# Patient Record
Sex: MALE | Race: WHITE | Employment: FULL TIME | URBAN - METROPOLITAN AREA
[De-identification: names, ages, dates, MRNs, and addresses within clinical notes are randomized per-mention and may not be internally consistent; named-entity substitution may affect disease eponyms.]

---

## 2019-09-26 ENCOUNTER — OFFICE VISIT (OUTPATIENT)
Dept: OPTOMETRY | Facility: CLINIC | Age: 64
End: 2019-09-26
Payer: COMMERCIAL

## 2019-09-26 DIAGNOSIS — H52.222 REGULAR ASTIGMATISM OF LEFT EYE: ICD-10-CM

## 2019-09-26 DIAGNOSIS — H25.13 NUCLEAR AGE-RELATED CATARACT, BOTH EYES: ICD-10-CM

## 2019-09-26 DIAGNOSIS — H52.01 HYPERMETROPIA, RIGHT: ICD-10-CM

## 2019-09-26 DIAGNOSIS — H35.373 MACULAR PUCKERING OF RETINA, BILATERAL: ICD-10-CM

## 2019-09-26 DIAGNOSIS — H52.4 PRESBYOPIA: ICD-10-CM

## 2019-09-26 DIAGNOSIS — H43.813 PVD (POSTERIOR VITREOUS DETACHMENT), BILATERAL: ICD-10-CM

## 2019-09-26 DIAGNOSIS — Z01.01 ENCOUNTER FOR EXAMINATION OF EYES AND VISION WITH ABNORMAL FINDINGS: Primary | ICD-10-CM

## 2019-09-26 DIAGNOSIS — H04.123 DRY EYES: ICD-10-CM

## 2019-09-26 PROCEDURE — 92015 DETERMINE REFRACTIVE STATE: CPT | Performed by: OPTOMETRIST

## 2019-09-26 PROCEDURE — 92004 COMPRE OPH EXAM NEW PT 1/>: CPT | Performed by: OPTOMETRIST

## 2019-09-26 ASSESSMENT — TONOMETRY
IOP_METHOD: APPLANATION
OS_IOP_MMHG: 15
OD_IOP_MMHG: 15

## 2019-09-26 ASSESSMENT — REFRACTION_MANIFEST
OS_AXIS: 118
OD_SPHERE: +0.50
OS_ADD: +2.50
OD_CYLINDER: SPHERE
OS_SPHERE: PLANO
OS_CYLINDER: +0.50
OD_ADD: +2.50

## 2019-09-26 ASSESSMENT — SLIT LAMP EXAM - LIDS
COMMENTS: NORMAL
COMMENTS: NORMAL

## 2019-09-26 ASSESSMENT — CONF VISUAL FIELD
OD_NORMAL: 1
OS_NORMAL: 1

## 2019-09-26 ASSESSMENT — EXTERNAL EXAM - RIGHT EYE: OD_EXAM: NORMAL

## 2019-09-26 ASSESSMENT — VISUAL ACUITY
OS_SC: 20/200
METHOD: SNELLEN - LINEAR
OS_SC: 20/40
OD_SC: 20/70
OD_SC: 20/200

## 2019-09-26 ASSESSMENT — EXTERNAL EXAM - LEFT EYE: OS_EXAM: NORMAL

## 2019-09-26 ASSESSMENT — CUP TO DISC RATIO
OS_RATIO: 0.2
OD_RATIO: 0.2

## 2019-09-26 NOTE — PROGRESS NOTES
Chief Complaint   Patient presents with     Annual Eye Exam      Accompanied by self  Last Eye Exam: ? Maybe first, patient unsure  Dilated Previously: Yes    What are you currently using to see?  readers       Distance Vision Acuity: Satisfied with vision    Near Vision Acuity: Not satisfied, uses readers +2.00    Eye Comfort: burning  Do you use eye drops? : No  Occupation or Hobbies: manager    Theresa Rosa, [employer] Tech          Medical, surgical and family histories reviewed and updated 9/26/2019.       OBJECTIVE: See Ophthalmology exam    ASSESSMENT:    ICD-10-CM    1. Encounter for examination of eyes and vision with abnormal findings Z01.01    2. Dry eyes H04.123    3. Macular puckering of retina, bilateral H35.373    4. Nuclear age-related cataract, both eyes H25.13    5. PVD (posterior vitreous detachment), bilateral H43.813    6. Hypermetropia, right H52.01    7. Regular astigmatism of left eye H52.222    8. Presbyopia H52.4       PLAN:     Patient Instructions    DRY EYE TREATMENT    I recommend using artificial tears for your dry eye. There are over the counter drops that work well and may be used up to 4x daily. ( systane balance, refresh optive, soothe xp)   If you need more than 4 drops daily, use a preservative free product which come in individual vials which may be used for 24 hours and discarded.     Artificial tears work best as a preventative and not as well after your eyes are starting to bother you.  It may take 4- 6 weeks of using the drops before you notice improvement.  If after that time you are still having problems schedule an appointment for an evaluation and discussion of different treatments.  Dry eyes are a chronic condition and you may have more symptoms at certain times of the year.      Additional recommended treatment:  Warm compresses once to twice daily for 5-10 minutes    Directions for warm soaks  There are few methods for hot compresses. Moisten a washcloth with hot  water, or microwave for 10 seconds, being careful to not get the cloth too hot.   Then put the washcloth onto your eyelids for 5 minutes. It will cool quickly so a rice pack or eyemask that can be heated and laid on top of the washcloth will help retain the heat.        You have a PVD- posterior vitreous detachment which is due to the gel of the eye shrinking and clumping together.  This can sometimes cause holes or tears in the retina.  The signs of a retinal detachment are flashes of light or a curtain coming over the vision. If you notice any of these changes please let me know right away.  If I am not available this is an emergency type situation that you would need to be seen.    You have the start of mild cataracts.  You may notice some blurred vision or glare with night driving.  It is important that you wear good sunglasses to protect your eyes from the ultraviolet light from the sun.     You have an epiretinal membrane.  As we grow older, the thick vitreous gel in the middle of our eyes begins to shrink and pull away from the macula. As the vitreous pulls away, scar tissue may develop on the macula. Sometimes the scar tissue can warp and contract, causing the retina to wrinkle or become swollen or distorted.    Héctor was advised of today's exam findings.  Fill glasses prescription  Allow 2 weeks to adapt to change in glasses  Wear glasses full time  Copy of glasses Rx provided today.  Return in 1 year for eye exam, or sooner if needed.    The effects of the dilating drops last for 4- 6 hours.  You will be more sensitive to light and vision will be blurry up close.  Mydriatic sunglasses were given if needed.      Miko Madrigal O.D.  [facility] [place] [place]  [address]    [phone]

## 2019-09-26 NOTE — PATIENT INSTRUCTIONS
DRY EYE TREATMENT    I recommend using artificial tears for your dry eye. There are over the counter drops that work well and may be used up to 4x daily. ( systane balance, refresh optive, soothe xp)   If you need more than 4 drops daily, use a preservative free product which come in individual vials which may be used for 24 hours and discarded.     Artificial tears work best as a preventative and not as well after your eyes are starting to bother you.  It may take 4- 6 weeks of using the drops before you notice improvement.  If after that time you are still having problems schedule an appointment for an evaluation and discussion of different treatments.  Dry eyes are a chronic condition and you may have more symptoms at certain times of the year.      Additional recommended treatment:  Warm compresses once to twice daily for 5-10 minutes    Directions for warm soaks  There are few methods for hot compresses. Moisten a washcloth with hot water, or microwave for 10 seconds, being careful to not get the cloth too hot.   Then put the washcloth onto your eyelids for 5 minutes. It will cool quickly so a rice pack or eyemask that can be heated and laid on top of the washcloth will help retain the heat.        You have a PVD- posterior vitreous detachment which is due to the gel of the eye shrinking and clumping together.  This can sometimes cause holes or tears in the retina.  The signs of a retinal detachment are flashes of light or a curtain coming over the vision. If you notice any of these changes please let me know right away.  If I am not available this is an emergency type situation that you would need to be seen.    You have the start of mild cataracts.  You may notice some blurred vision or glare with night driving.  It is important that you wear good sunglasses to protect your eyes from the ultraviolet light from the sun.     You have an epiretinal membrane.  As we grow older, the thick vitreous gel in the  middle of our eyes begins to shrink and pull away from the macula. As the vitreous pulls away, scar tissue may develop on the macula. Sometimes the scar tissue can warp and contract, causing the retina to wrinkle or become swollen or distorted.    Héctor was advised of today's exam findings.  Fill glasses prescription  Allow 2 weeks to adapt to change in glasses  Wear glasses full time  Copy of glasses Rx provided today.  Return in 1 year for eye exam, or sooner if needed.    The effects of the dilating drops last for 4- 6 hours.  You will be more sensitive to light and vision will be blurry up close.  Mydriatic sunglasses were given if needed.      Miko Madrigal O.D.  [facility] - [address] [place]  [address]    [phone]

## 2019-11-01 ENCOUNTER — OFFICE VISIT (OUTPATIENT)
Dept: OPTOMETRY | Facility: CLINIC | Age: 64
End: 2019-11-01
Payer: COMMERCIAL

## 2019-11-01 DIAGNOSIS — H52.4 PRESBYOPIA: ICD-10-CM

## 2019-11-01 DIAGNOSIS — Z46.0 CONTACT LENS/GLASSES FITTING: Primary | ICD-10-CM

## 2019-11-01 PROCEDURE — 99207 ZZC NO CHARGE LOS: CPT | Performed by: OPTOMETRIST

## 2019-11-01 ASSESSMENT — VISUAL ACUITY
OS_CC: 20/80
OD_CC: 20/25
CORRECTION_TYPE: GLASSES
OD_CC: 20/60
METHOD: SNELLEN - LINEAR
OS_CC: 20/30

## 2019-11-01 ASSESSMENT — REFRACTION_WEARINGRX
OS_ADD: +2.50
OD_ADD: +2.50
OD_CYLINDER: SPHERE
SPECS_TYPE: PAL
OS_AXIS: 123
OD_SPHERE: +0.50
OS_SPHERE: +0.25
OS_CYLINDER: +0.25

## 2019-11-01 ASSESSMENT — REFRACTION_MANIFEST
OS_ADD: +2.50
OS_SPHERE: PLANO
OD_SPHERE: +0.50
OD_ADD: +2.50
OS_CYLINDER: +0.50
OS_AXIS: 155
OD_CYLINDER: SPHERE

## 2019-11-01 NOTE — PROGRESS NOTES
RX CHECK    Problems with glasses: right eye is blurry- distance and near. Patient can't wear the glasses. Patient states it feels like the eyes are bouncing around    Patient got the glasses at [facility]    Optometry Tech       Objective: See Ophthalmology exam    Assessment:    ICD-10-CM    1. Contact lens/glasses fitting Z46.0    2. Presbyopia H52.4        Plan:  Prescription stable.   Recommend re-making glasses as lined bifocal. Patient may be unable to adapt to progressive style lenses.       Miko Madrigal O.D.  [address]  [place]  [address]    [phone]

## 2019-11-01 NOTE — LETTER
11/1/2019         RE: Héctor Yen  [address]  [facility] [address]        Dear Colleague,    Thank you for referring your patient, Héctor Yen, to the [facility]. Please see a copy of my visit note below.    RX CHECK    Problems with glasses: right eye is blurry- distance and near. Patient can't wear the glasses. Patient states it feels like the eyes are bouncing around    Patient got the glasses at [facility] [employer]    Optometry Tech       Objective: See Ophthalmology exam    Assessment:    ICD-10-CM    1. Contact lens/glasses fitting Z46.0    2. Presbyopia H52.4        Plan:  Prescription stable.   Recommend re-making glasses as lined bifocal. Patient may be unable to adapt to progressive style lenses.       Miko Madrigal O.D.  [facility]  [address] [place] [address]. [place]  [address]    [phone]            Again, thank you for allowing me to participate in the care of your patient.        Sincerely,        Miko Madrigal, PEDRO

## 2019-11-01 NOTE — PATIENT INSTRUCTIONS
Prescription stable.   Recommend re-making glasses as lined bifocal. Patient may be unable to adapt to progressive style lenses.       Miko Madrigal O.D.  [address]  [place]  [address] [phone]

## 2021-04-07 NOTE — LETTER
04/08/21      Glendy Arredondo  [address] [facility] [address]       To whom it may concern,    Glendy Arredondo is a my patient at [facility] since 2/15/2005 where she is being treated for Schizophrenic Illness.Despite her compliance with treatment she continues to struggle with severe symptoms of her psychiatric illness(es) and, as a result, is not able to work. Glendy is totally and permanently disabled due to this chronic Schizophrenic illness.       If you have any further inquiries please feel free to contact the office at [phone].      Sincerely,       Nima Angelo MD  [facility]  [address] [facility] [address]  Phone: [phone]  Fax: [phone]                   9/26/2019         RE: Héctor Yen  [address]  [facility] [place] [address]        Dear Colleague,    Thank you for referring your patient, Héctor Yen, to the [facility]. Please see a copy of my visit note below.    Chief Complaint   Patient presents with     Annual Eye Exam      Accompanied by self  Last Eye Exam: ? Maybe first, patient unsure  Dilated Previously: Yes    What are you currently using to see?  readers       Distance Vision Acuity: Satisfied with vision    Near Vision Acuity: Not satisfied, uses readers +2.00    Eye Comfort: burning  Do you use eye drops? : No  Occupation or Hobbies: manager    Theresa Rosa, Optometric Tech          Medical, surgical and family histories reviewed and updated 9/26/2019.       OBJECTIVE: See Ophthalmology exam    ASSESSMENT:    ICD-10-CM    1. Encounter for examination of eyes and vision with abnormal findings Z01.01    2. Dry eyes H04.123    3. Macular puckering of retina, bilateral H35.373    4. Nuclear age-related cataract, both eyes H25.13    5. PVD (posterior vitreous detachment), bilateral H43.813    6. Hypermetropia, right H52.01    7. Regular astigmatism of left eye H52.222    8. Presbyopia H52.4       PLAN:     Patient Instructions    DRY EYE TREATMENT    I recommend using artificial tears for your dry eye. There are over the counter drops that work well and may be used up to 4x daily. ( systane balance, refresh optive, soothe xp)   If you need more than 4 drops daily, use a preservative free product which come in individual vials which may be used for 24 hours and discarded.     Artificial tears work best as a preventative and not as well after your eyes are starting to bother you.  It may take 4- 6 weeks of using the drops before you notice improvement.  If after that time you are still having problems schedule an appointment for an evaluation and discussion of different treatments.  Dry eyes are a chronic  condition and you may have more symptoms at certain times of the year.      Additional recommended treatment:  Warm compresses once to twice daily for 5-10 minutes    Directions for warm soaks  There are few methods for hot compresses. Moisten a washcloth with hot water, or microwave for 10 seconds, being careful to not get the cloth too hot.   Then put the washcloth onto your eyelids for 5 minutes. It will cool quickly so a rice pack or eyemask that can be heated and laid on top of the washcloth will help retain the heat.        You have a PVD- posterior vitreous detachment which is due to the gel of the eye shrinking and clumping together.  This can sometimes cause holes or tears in the retina.  The signs of a retinal detachment are flashes of light or a curtain coming over the vision. If you notice any of these changes please let me know right away.  If I am not available this is an emergency type situation that you would need to be seen.    You have the start of mild cataracts.  You may notice some blurred vision or glare with night driving.  It is important that you wear good sunglasses to protect your eyes from the ultraviolet light from the sun.     You have an epiretinal membrane.  As we grow older, the thick vitreous gel in the middle of our eyes begins to shrink and pull away from the macula. As the vitreous pulls away, scar tissue may develop on the macula. Sometimes the scar tissue can warp and contract, causing the retina to wrinkle or become swollen or distorted.    Héctor was advised of today's exam findings.  Fill glasses prescription  Allow 2 weeks to adapt to change in glasses  Wear glasses full time  Copy of glasses Rx provided today.  Return in 1 year for eye exam, or sooner if needed.    The effects of the dilating drops last for 4- 6 hours.  You will be more sensitive to light and vision will be blurry up close.  Mydriatic sunglasses were given if needed.      CINTHYA Reyes  [address]  [place]  [address]    [phone]             Again, thank you for allowing me to participate in the care of your patient.        Sincerely,        Miko Madrigal OD